# Patient Record
Sex: MALE | Race: WHITE
[De-identification: names, ages, dates, MRNs, and addresses within clinical notes are randomized per-mention and may not be internally consistent; named-entity substitution may affect disease eponyms.]

---

## 2021-06-28 LAB
BUN BLD-MCNC: 10 MG/DL (ref 7–18)
GLUCOSE SERPLBLD-MCNC: 99 MG/DL (ref 74–106)
HCT VFR BLD CALC: 48 % (ref 39.6–49)
LYMPHOCYTES # SPEC AUTO: 1.7 K/UL (ref 0.7–4.9)
PMV BLD: 7.8 FL (ref 7.6–11.3)
POTASSIUM SERPL-SCNC: 4.3 MMOL/L (ref 3.5–5.1)
RBC # BLD: 5.17 M/UL (ref 4.33–5.43)

## 2021-06-28 NOTE — RAD REPORT
EXAM DESCRIPTION:  RAD - Chest Pa And Lat (2 Views) - 6/28/2021 12:57 pm

 

CLINICAL HISTORY:  pre op, pending colonoscopy

 

COMPARISON:  Portable chest November 2012

 

TECHNIQUE:  Frontal and lateral views of the chest were obtained.

 

FINDINGS:  The lungs are clear.   Heart size is normal and central vasculature is within normal limit
s.  No pleural effusion or pneumothorax seen.  No acute bony finding noted.  No aortic abnormality.

 

IMPRESSION:  No acute cardiopulmonary process.

 

 No significant change from comparison study.

## 2021-06-29 NOTE — EKG
Test Date:    2021-06-28               Test Time:    11:44:13

Technician:   ERICK                                   

                                                     

MEASUREMENT RESULTS:                                       

Intervals:                                           

Rate:         60                                     

SD:           178                                    

QRSD:         84                                     

QT:           402                                    

QTc:          402                                    

Axis:                                                

P:            49                                     

SD:           178                                    

QRS:          12                                     

T:            21                                     

                                                     

INTERPRETIVE STATEMENTS:                                       

                                                     

Normal sinus rhythm

Normal ECG

Compared to ECG 11/01/2012 13:17:48

Sinus tachycardia no longer present



Electronically Signed On 06-29-21 12:54:07 CDT by Lenny Yen

## 2021-06-30 ENCOUNTER — HOSPITAL ENCOUNTER (OUTPATIENT)
Dept: HOSPITAL 97 - OR | Age: 61
Discharge: HOME | End: 2021-06-30
Attending: SURGERY
Payer: COMMERCIAL

## 2021-06-30 VITALS — DIASTOLIC BLOOD PRESSURE: 74 MMHG | TEMPERATURE: 97.4 F | OXYGEN SATURATION: 95 % | SYSTOLIC BLOOD PRESSURE: 108 MMHG

## 2021-06-30 DIAGNOSIS — N40.0: ICD-10-CM

## 2021-06-30 DIAGNOSIS — Z12.11: Primary | ICD-10-CM

## 2021-06-30 PROCEDURE — 45378 DIAGNOSTIC COLONOSCOPY: CPT

## 2021-06-30 PROCEDURE — 85025 COMPLETE CBC W/AUTO DIFF WBC: CPT

## 2021-06-30 PROCEDURE — 71046 X-RAY EXAM CHEST 2 VIEWS: CPT

## 2021-06-30 PROCEDURE — 93005 ELECTROCARDIOGRAM TRACING: CPT

## 2021-06-30 PROCEDURE — 36415 COLL VENOUS BLD VENIPUNCTURE: CPT

## 2021-06-30 PROCEDURE — 0DJD8ZZ INSPECTION OF LOWER INTESTINAL TRACT, VIA NATURAL OR ARTIFICIAL OPENING ENDOSCOPIC: ICD-10-PCS

## 2021-06-30 PROCEDURE — 80048 BASIC METABOLIC PNL TOTAL CA: CPT

## 2021-06-30 NOTE — ENDO RPT
[address], [address]





COLONOSCOPY PROCEDURE REPORT     EXAM DATE: 06/30/2021



PATIENT NAME:      Davonte Vogel           MR #:      [id number]

YOB: 1960      VISIT #:     [id number]

ATTENDING:     Sky Walker M.D.     STATUS:     outpatient

ASSISTANT:      Juju SAGE and Kelsi Montejo RN



INDICATIONS:  The patient is a 60 yr old Male here for a colonoscopy due to

colon cancer screening

PROCEDURE PERFORMED:     Colonoscopy

MEDICATIONS:     Per Anesthesia.

ESTIMATED BLOOD LOSS:     None



CONSENT: The patient understands the risks and benefits of the procedure and

understands that these risks include, but are not limited to: sedation,

allergic reaction, infection, perforation and/or bleeding. Alternative means of

evaluation and treatment include, among others: physical exam, x-rays, and/or

surgical intervention. The patient elects to proceed with this endoscopic

procedure.



DESCRIPTION OF PROCEDURE:  During intra-op preparation period all mechanical 

medical equipment was checked for proper function. Hand hygiene and appropriate

measures for infection prevention was taken.  Procedure, possible

complications, alternatives including, but not limited to possibility of

bleeding, perforation, tear, infection, sepsis, need for surgery, need for

blood transfusion, were explained to the patient.  After the risks, benefits

and alternatives of the procedure were thoroughly explained, Informed consent

was verified, confirmed and timeout was successfully executed by the treatment

team.  The patient was placed in the left lateral position.   A digital rectal

exam was performed and revealed an enlarged prostate.  After appropriate level

of anesthesia, the scope was passed.  The EC-[id number] ([id number]) endoscope was

introduced through the anus and advanced to the cecum, which was identified by

the ileocecal valve. The quality of the prep was good. The instrument was then

slowly withdrawn as the colon was fully examined.  Scope withdrawal time was 7

minutes.



COLON FINDINGS: A normal appearing cecum, ileocecal valve, and appendiceal

orifice were identified.  the ascending, transverse, descending, sigmoid colon,

and rectum appeared unremarkable.  Retroflexed views revealed no abnormalities.

The scope was then completely withdrawn from the patient and the procedure

terminated.







ADVERSE EVENTS:      There were no complications.

IMPRESSIONS:     A normal appearing cecum, ileocecal valve, and appendiceal

orifice were identified.  the ascending, transverse, descending, sigmoid colon,

and rectum appeared unremarkable



RECOMMENDATIONS:     1.  fiber rich diet

2.  follow-up: office 1 week(s)

RECALL:     Return in 0 year(s)



_____________________________

Sky Walker M.D.

eSigned:  Sky Walker M.D. 06/30/2021 12:07 PM





cc:  Stephan Iniguez M.D, Arron Toro M.D, Davi Oliver M.D, Todd Monteiro M.D, Sd Malone, Natalie Barahona M.D, Natalie Leal M.D, Davie CHAPA, Herber Ngo M.D, Lenny Yen M.D, Nikolai GOMEZ, Vahid Allison M.D, Vahid Logan M.D, Amy Martins M.D, Mehrdad Busby M.D, Jessica Rinaldi M.D, Luiz Humphries M.D, Shun Lawrence M.D, Ada LIM, Valencia Melchor M.D, Ton Wheat M.D, WINTER Wheat M.D, TKaterin Wheat MALFONSO, Campbell Bañuelos M.D,

Jackson Swanson M.D, Cris Melendez M.D, Branydn Alamo M.D, LAURA Colbert M.D,

Polo Leiva M.D, Ilya Green M.D, Jake May M.D, Satish Boyd M.D, Markus Francisco April Day M.D, Henok Reyna M.D, Duncan Lopez M.D, Loida Downing M.D, and Loida Vazquez M.D.



CPT CODES:

ICD9 CODES:     600.0 Hypertrophy (benign) of prostate





PATIENT NAME:  Davonte Vogel

MR#: [id number]